# Patient Record
Sex: FEMALE | URBAN - METROPOLITAN AREA
[De-identification: names, ages, dates, MRNs, and addresses within clinical notes are randomized per-mention and may not be internally consistent; named-entity substitution may affect disease eponyms.]

---

## 2023-08-23 ENCOUNTER — OFFICE VISIT (OUTPATIENT)
Dept: URBAN - METROPOLITAN AREA CLINIC 98 | Facility: CLINIC | Age: 43
End: 2023-08-23
Payer: COMMERCIAL

## 2023-08-23 ENCOUNTER — WEB ENCOUNTER (OUTPATIENT)
Dept: URBAN - METROPOLITAN AREA CLINIC 98 | Facility: CLINIC | Age: 43
End: 2023-08-23

## 2023-08-23 ENCOUNTER — LAB OUTSIDE AN ENCOUNTER (OUTPATIENT)
Dept: URBAN - METROPOLITAN AREA CLINIC 98 | Facility: CLINIC | Age: 43
End: 2023-08-23

## 2023-08-23 VITALS
BODY MASS INDEX: 29.66 KG/M2 | TEMPERATURE: 98.2 F | HEART RATE: 70 BPM | DIASTOLIC BLOOD PRESSURE: 71 MMHG | WEIGHT: 178 LBS | SYSTOLIC BLOOD PRESSURE: 107 MMHG | HEIGHT: 65 IN

## 2023-08-23 DIAGNOSIS — K21.9 GERD WITHOUT ESOPHAGITIS: ICD-10-CM

## 2023-08-23 DIAGNOSIS — E66.9 OBESITY (BMI 30.0-34.9): ICD-10-CM

## 2023-08-23 PROCEDURE — 99243 OFF/OP CNSLTJ NEW/EST LOW 30: CPT | Performed by: INTERNAL MEDICINE

## 2023-08-23 PROCEDURE — 99203 OFFICE O/P NEW LOW 30 MIN: CPT | Performed by: INTERNAL MEDICINE

## 2023-08-23 RX ORDER — BIOTIN 5 MG
AS DIRECTED TABLET ORAL
Status: ACTIVE | COMMUNITY
Start: 2023-08-25

## 2023-08-23 RX ORDER — PANTOPRAZOLE SODIUM 40 MG/1
1 TABLET TABLET, DELAYED RELEASE ORAL ONCE A DAY
Qty: 30 | Refills: 3 | OUTPATIENT
Start: 2023-08-23

## 2023-08-23 RX ORDER — MAGNESIUM OXIDE/MAG AA CHELATE 300 MG
1 CAPSULE WITH A MEAL CAPSULE ORAL ONCE A DAY
Status: ACTIVE | COMMUNITY
Start: 2023-08-25

## 2023-08-23 RX ORDER — MULTIVIT WITH MINERALS/LUTEIN
AS DIRECTED TABLET ORAL
Status: ACTIVE | COMMUNITY
Start: 2023-08-25

## 2023-08-23 NOTE — HPI-TODAY'S VISIT:
Patient referred  by Modesto Khalil MD for evaluation of chronic p-prandial dyspepsia and constipation. Copy of this consult OV sent to Dr. Khalil. 43 yo pt w intermittent p-prandial dyspepsia, epigastric fullness, bloating, abdominal distention wo glenn GIBRAN sxs and denies dysphagia / odynophagia. Has been taking Jayne-Rector prn w some improvement in sxs. Reports, mild constipation, better after DUNCAN; has been on high fiber, low CHO diet w good results. No melenic stools and no hematochezia. Has gained weight lately. Denies cardiorespiratory nor constitutional sxs. On no Rx. Labs 5/23: all normal.  No other complaints.

## 2023-08-25 ENCOUNTER — LAB OUTSIDE AN ENCOUNTER (OUTPATIENT)
Dept: URBAN - METROPOLITAN AREA CLINIC 98 | Facility: CLINIC | Age: 43
End: 2023-08-25

## 2023-08-25 PROBLEM — 443371000124107: Status: ACTIVE | Noted: 2023-08-25

## 2023-08-25 PROBLEM — 266435005: Status: ACTIVE | Noted: 2023-08-23

## 2023-09-15 ENCOUNTER — TELEPHONE ENCOUNTER (OUTPATIENT)
Dept: URBAN - METROPOLITAN AREA CLINIC 98 | Facility: CLINIC | Age: 43
End: 2023-09-15

## 2023-09-19 ENCOUNTER — TELEPHONE ENCOUNTER (OUTPATIENT)
Dept: URBAN - METROPOLITAN AREA CLINIC 98 | Facility: CLINIC | Age: 43
End: 2023-09-19

## 2023-09-25 ENCOUNTER — OFFICE VISIT (OUTPATIENT)
Dept: URBAN - METROPOLITAN AREA SURGERY CENTER 18 | Facility: SURGERY CENTER | Age: 43
End: 2023-09-25

## 2023-10-26 ENCOUNTER — LAB OUTSIDE AN ENCOUNTER (OUTPATIENT)
Dept: URBAN - METROPOLITAN AREA CLINIC 98 | Facility: CLINIC | Age: 43
End: 2023-10-26

## 2023-10-26 ENCOUNTER — OFFICE VISIT (OUTPATIENT)
Dept: URBAN - METROPOLITAN AREA CLINIC 98 | Facility: CLINIC | Age: 43
End: 2023-10-26
Payer: COMMERCIAL

## 2023-10-26 VITALS
BODY MASS INDEX: 28.16 KG/M2 | SYSTOLIC BLOOD PRESSURE: 122 MMHG | WEIGHT: 169 LBS | TEMPERATURE: 97.2 F | HEART RATE: 66 BPM | DIASTOLIC BLOOD PRESSURE: 89 MMHG | HEIGHT: 65 IN

## 2023-10-26 DIAGNOSIS — K57.92 DIVERTICULITIS: ICD-10-CM

## 2023-10-26 DIAGNOSIS — K21.9 GERD WITHOUT ESOPHAGITIS: ICD-10-CM

## 2023-10-26 PROBLEM — 307496006: Status: ACTIVE | Noted: 2023-10-26

## 2023-10-26 PROCEDURE — 99214 OFFICE O/P EST MOD 30 MIN: CPT | Performed by: INTERNAL MEDICINE

## 2023-10-26 RX ORDER — SODIUM, POTASSIUM,MAG SULFATES 17.5-3.13G
354ML SOLUTION, RECONSTITUTED, ORAL ORAL
Qty: 345 MILLILITER | Refills: 0 | OUTPATIENT
Start: 2023-10-26 | End: 2023-10-27

## 2023-10-26 RX ORDER — MULTIVIT WITH MINERALS/LUTEIN
AS DIRECTED TABLET ORAL
Status: ON HOLD | COMMUNITY
Start: 2023-08-25

## 2023-10-26 RX ORDER — MAGNESIUM OXIDE/MAG AA CHELATE 300 MG
1 CAPSULE WITH A MEAL CAPSULE ORAL ONCE A DAY
Status: ON HOLD | COMMUNITY
Start: 2023-08-25

## 2023-10-26 RX ORDER — PANTOPRAZOLE SODIUM 40 MG/1
1 TABLET TABLET, DELAYED RELEASE ORAL ONCE A DAY
Qty: 30 | Refills: 3 | Status: ON HOLD | COMMUNITY
Start: 2023-08-23

## 2023-10-26 RX ORDER — BIOTIN 5 MG
AS DIRECTED TABLET ORAL
Status: ON HOLD | COMMUNITY
Start: 2023-08-25

## 2023-10-26 NOTE — HPI-TODAY'S VISIT:
44 yo pt who comes in today for evaluation after being Dx's w L-uncomplicated diverticulitis at [facility]; she was admitted for 5 days, unclear if microperforation was documented; no abscess or pericolonic fluid collection. Today, w mild and sharp discomfort in RLQ wo radiation and no urologic sxs. Normal bm's qod wo melenic stools and no hematochezia. Has been on prn MOM. No fever, chills nor night sweats. GIBRAN - dyspepsia controlled w diet wo Rx after losing ~ 20 lbs since her admission.Has been on high fiber, low CHO diet w good results. No melenic stools and no hematochezia. Denies cardiorespiratory nor constitutional sxs. On no Rx. Labs 5/23: all normal.  No other complaints.

## 2023-10-28 ENCOUNTER — DASHBOARD ENCOUNTERS (OUTPATIENT)
Age: 43
End: 2023-10-28

## 2023-10-30 ENCOUNTER — CLAIMS CREATED FROM THE CLAIM WINDOW (OUTPATIENT)
Dept: URBAN - METROPOLITAN AREA CLINIC 4 | Facility: CLINIC | Age: 43
End: 2023-10-30
Payer: COMMERCIAL

## 2023-10-30 ENCOUNTER — OFFICE VISIT (OUTPATIENT)
Dept: URBAN - METROPOLITAN AREA SURGERY CENTER 18 | Facility: SURGERY CENTER | Age: 43
End: 2023-10-30
Payer: COMMERCIAL

## 2023-10-30 DIAGNOSIS — K31.89 OTHER DISEASES OF STOMACH AND DUODENUM: ICD-10-CM

## 2023-10-30 DIAGNOSIS — K21.9 ACID REFLUX: ICD-10-CM

## 2023-10-30 DIAGNOSIS — K31.9 ERYTHEMA OF GASTRIC ANTRUM: ICD-10-CM

## 2023-10-30 DIAGNOSIS — K44.9 HIATAL HERNIA: ICD-10-CM

## 2023-10-30 DIAGNOSIS — Z87.19 H/O DIVERTICULITIS OF COLON: ICD-10-CM

## 2023-10-30 DIAGNOSIS — K29.70 GASTRITIS, UNSPECIFIED, WITHOUT BLEEDING: ICD-10-CM

## 2023-10-30 DIAGNOSIS — Z09 CARDIOLOGY FOLLOW-UP ENCOUNTER: ICD-10-CM

## 2023-10-30 DIAGNOSIS — K57.30 DIVERTICULOSIS OF SIGMOID COLON: ICD-10-CM

## 2023-10-30 DIAGNOSIS — K57.32 DIVERTICULITIS OF ASCENDING COLON: ICD-10-CM

## 2023-10-30 PROCEDURE — 00813 ANES UPR LWR GI NDSC PX: CPT | Performed by: STUDENT IN AN ORGANIZED HEALTH CARE EDUCATION/TRAINING PROGRAM

## 2023-10-30 PROCEDURE — 88305 TISSUE EXAM BY PATHOLOGIST: CPT | Performed by: PATHOLOGY

## 2023-10-30 PROCEDURE — 45378 DIAGNOSTIC COLONOSCOPY: CPT | Performed by: INTERNAL MEDICINE

## 2023-10-30 PROCEDURE — 88312 SPECIAL STAINS GROUP 1: CPT | Performed by: PATHOLOGY

## 2023-10-30 PROCEDURE — 43239 EGD BIOPSY SINGLE/MULTIPLE: CPT | Performed by: INTERNAL MEDICINE

## 2023-10-30 PROCEDURE — G8907 PT DOC NO EVENTS ON DISCHARG: HCPCS | Performed by: INTERNAL MEDICINE

## 2023-10-30 RX ORDER — BIOTIN 5 MG
AS DIRECTED TABLET ORAL
Status: ON HOLD | COMMUNITY
Start: 2023-08-25

## 2023-10-30 RX ORDER — PANTOPRAZOLE SODIUM 40 MG/1
1 TABLET TABLET, DELAYED RELEASE ORAL ONCE A DAY
Qty: 30 | Refills: 3 | Status: ON HOLD | COMMUNITY
Start: 2023-08-23

## 2023-10-30 RX ORDER — MULTIVIT WITH MINERALS/LUTEIN
AS DIRECTED TABLET ORAL
Status: ON HOLD | COMMUNITY
Start: 2023-08-25

## 2023-10-30 RX ORDER — MAGNESIUM OXIDE/MAG AA CHELATE 300 MG
1 CAPSULE WITH A MEAL CAPSULE ORAL ONCE A DAY
Status: ON HOLD | COMMUNITY
Start: 2023-08-25

## 2023-11-10 ENCOUNTER — OFFICE VISIT (OUTPATIENT)
Dept: URBAN - METROPOLITAN AREA TELEHEALTH 2 | Facility: TELEHEALTH | Age: 43
End: 2023-11-10

## 2024-11-22 ENCOUNTER — TELEPHONE ENCOUNTER (OUTPATIENT)
Dept: URBAN - METROPOLITAN AREA CLINIC 98 | Facility: CLINIC | Age: 44
End: 2024-11-22